# Patient Record
(demographics unavailable — no encounter records)

---

## 2024-12-06 NOTE — ASSESSMENT
[FreeTextEntry1] : 39 yom w/ severe neck and right arm pain.  I have personally reviewed the patient's MRI in detail and discussed my personal interpretation of the study which is significant for severe stenosis at C6-C7.  In my opinion he is a surgical candidate, however, he declines consultation when I have offered it to him.  The patient has failed to have relief with medication management. The patient has failed to have relief with more then six weeks of physical therapy within the last three months. Given the patients failure to improve with all other conservative measures, recommend C7-T1 interlaminar epidural steroid injection under fluoroscopic guidance. The patient will follow-up with me in my office two weeks following intervention.  I have discussed in detail with the patient that an interventional spine procedure is associated with potential risks. The procedure may include an injection of steroid and potentially other medications (local anesthetic and normal saline) into the epidural space or surrounding tissue of the spine. There are significant risks of this procedure which include and are not limited to infection, bleeding, worsening pain, dural puncture leading to post-dural puncture headache, nerve damage, spinal cord injury, paralysis, stroke, and death. There is a chance that the procedure does not improve their pain. There are risks associated with the steroid being absorbed into the body systemically. These include dysphoria, difficulty sleeping, mood swings, and personality changes. Pre-menopausal women may notice a regularity his in her menstrual cycle for 2-3 months following the injection. Steroids can specifically affect patients with hypertension, diabetes, and peptic ulcers. The procedure may cause a temporary increase in blood pressure and blood glucose, and may adversely affect a peptic ulcer. Other, more rare complications, including avascular necrosis of the joints, glaucoma, and osteoporosis. I have discussed the risks of the procedure at length with the patient, and the potential benefits of pain relief. I have offered alternatives to the procedure. All questions were answered. The patient expressed understanding and wishes to proceed with the procedure.  Physical therapy prescribed - goal will be to increase ROM, strengthening, postural training, other modalities ad lynnette which may include massage and stim. Goals of therapy discussed with the patient in detail and will be discussed with physical therapist. Patient will follow-up following course of physical therapy to monitor progress and adjust therapy as needed.  Acetaminophen 1,000 mg q8h prn for moderate pain. Risks, benefits, and alternatives of acetaminophen discussed with patient.  Ibuprofen 600 mg q8h prn add when pain is not adequately controlled with acetaminophen. Risks, benefits, and alternatives of ibuprofen discussed with patient.  Diet and nutritional strategies discussed which may improve patients pain and will improve overall health.

## 2024-12-06 NOTE — HISTORY OF PRESENT ILLNESS
[Neck Pain] : neck pain [Constant] : constant [8] : a current pain level of 8/10 [7] : an average pain level of 7/10 [6] : a minimum pain level of 6/10 [10] : a maximum pain level of 10/10 [Shooting] : shooting [Electric] : electric [Turning Head] : turning head [Rest] : rest [FreeTextEntry1] : 39 yom w/ severe neck and right arm pain. Quality of life is impaired. There has been a severe exacerbation of the patient's chronic pain. Pain began after lifting a heavy object. Has only mild weakness. Does not desire surgical consultation when I offered it.

## 2024-12-06 NOTE — DATA REVIEWED
[FreeTextEntry1] : MRI Cervical Spine (11/13/24):  C5-C6: moderate stenosis C6-C7: severe stenosis with severe right foraminal narrowing.

## 2024-12-06 NOTE — PHYSICAL EXAM
[de-identified] : Constitutional: Well-developed, in no acute distress  Musculoskeletal: Cervical Spine:    Gait: Antalgic Inspection: Normal curvature, no abnormal kyphosis or scoliosis  Facet loading: pain bilaterally  Palpation: Cervical paraspinal muscles: pain bilaterally 		 Muscle Strength: Deltoid: 5/5 bilaterally Biceps: 5/5 bilaterally Triceps: 5/5 bilaterally Adductor pollicis: 5/5 bilaterally  Sensation: normal and equal in bilateral upper extremities  Extremity: no edema noted Neurological: Memory normal, AAO x 3, Cranial nerves II - XII grossly normal Psychiatric: Appropriate mood and affect, oriented to time, place, person, and situation

## 2024-12-09 NOTE — PROCEDURE
[FreeTextEntry1] : PROCEDURE: C7-T1 Interlaminar epidural steroid injection under fluoroscopic guidance.  ANESTHESIA:  Local.  COMPLICATIONS:  	None.  ESTIMATED BLOOD LOSS:  None.	  INDICATIONS: Mr. Rubinstein is a very pleasant 39 yom who has significant neck and arm pain.  The patient has failed to have relief with medication management and physical therapy. Given their failure to improve with all other conservative measures, it was determined that the patient would benefit from a C7-T1 interlaminar epidural steroid injection under fluoroscopic guidance.    The patient denies any fevers, chills, nausea, vomiting, diarrhea, constipation, chest pain, shortness of breath, or burning on urination.  They deny any latex allergy.  They are not taking any anticoagulants and do not have a history of coagulopathy.  The patient denies any IV contrast allergy.       PROCEDURE COURSE: The risks, benefits, and alternatives of the lumbar interlaminar epidural steroid injection were explained to the patient.  All questions were answered to their own satisfaction.  Written consent was signed and placed in the chart. The patients low back was marked in the preoperative holding area.   The patient was brought to the Operating Room and placed in the prone position with a pillow under the abdomen to reduce lumbar lordosis.  A time-out was taken identifying the patient, the procedure, the site and side, and the patients allergies.  ASA standard monitors were applied for monitoring throughout the procedure.  The patients back was prepped and draped with Chloroprep in the usual sterile fashion and sterile technique was adhered to throughout the entire procedure.  The cervical spine was visualized under fluoroscopy in the AP view. The 12th rib and T12 vertebra were identified as a reference point and the cervical vertebral bodies were counted.  The T1 vertebral body was then squared. The vertebral body and the superior and inferior end plates were aligned and the spinous processes were adjusted until midline. The C7-T1 interlaminar space was identified under fluoroscopic guidance and a caudal tilt was utilized to optimize the opening of the interlaminar space. The skin and subcutaneous tissues were infiltrated with 1% Lidocaine. After adequate local anesthesia was obtained, a 20g Tuohy needle was inserted at C7-T1 interspace. The needle was carefully advanced, alternating between AP and lateral views, to ensure appropriate trajectory and depth. Once the ligamentum flavum was engaged, a sterile glass syringe was employed and a loss of resistance to air technique was utilized to identify the epidural space. Once obtained, negative aspiration for hem and CSF was obtained, further confirming location. Following this, 1 cc of contrast was administered and epidural spread was confirmed in the AP and lateral views. Following this, 80 mg of methylprednisolone and 2 cc of 1% lidocaine was slowly administered in incremental amounts.  All injections were done with negative aspiration for cerebrospinal fluid or heme, and all needles were withdrawn without incident. I personally met with the patient following the procedure and all questions were answered. The patient tolerated the procedure well without any apparent complications and was transferred to the Recovery Room in stable condition. The patient was provided with discharge instructions and will follow-up with me in my office.  	___________________________________ 	Weston Griffin M.D.